# Patient Record
Sex: MALE | Race: WHITE | NOT HISPANIC OR LATINO | Employment: OTHER | URBAN - METROPOLITAN AREA
[De-identification: names, ages, dates, MRNs, and addresses within clinical notes are randomized per-mention and may not be internally consistent; named-entity substitution may affect disease eponyms.]

---

## 2023-11-15 ASSESSMENT — ENCOUNTER SYMPTOMS
DIFFICULTY URINATING: 0
EYES NEGATIVE: 1
ALLERGIC/IMMUNOLOGIC NEGATIVE: 1
FEVER: 0
CHILLS: 0
COUGH: 0
NAUSEA: 0
ENDOCRINE NEGATIVE: 1
SHORTNESS OF BREATH: 0
PSYCHIATRIC NEGATIVE: 1

## 2023-11-15 NOTE — PROGRESS NOTES
Subjective   Patient ID: Corey May is a 78 y.o. male.    HPI  Hx of prostate ca. S/P XRT in 2012..MRI on 12/22 showed focal T2 hypointense lesion with diffusion restriction in the left peripheral zone at the prostate midgland suspicious for underlying viable neoplasm.  Most recent PSA was 8.46 on 11/23. Prior PSA was  7.14 on 4/23. Prior PSA was 6.83 on 11/22. Prior PSA was 5.31 on 5/22. Prior PSA is 5.86 (11/21) Previous PSA was 4.77 on 6/2021. . Prior PSA was 4.3 12/20.. Post XRT Bx has not done been done..LUTs are chronic and mild...some urgency and frequency..weak stream at times...No dysuria...No hematuria...Nocturia x1-2.. Caffeine does worsen LUTs.. No medications for LUTs.. ED is chronic. Patient has Sildenafil       Review of Systems   Constitutional:  Negative for chills and fever.   HENT: Negative.     Eyes: Negative.    Respiratory:  Negative for cough and shortness of breath.    Cardiovascular:  Negative for chest pain and leg swelling.   Gastrointestinal:  Negative for nausea.   Endocrine: Negative.    Genitourinary:  Negative for difficulty urinating.        Negative except for documented in HPI   Allergic/Immunologic: Negative.    Neurological:         Alert & oriented X 3   Hematological:         Denies blood thinners   Psychiatric/Behavioral: Negative.         Objective   Physical Exam  Vitals and nursing note reviewed.   Constitutional:       General: He is not in acute distress.     Appearance: Normal appearance.   Pulmonary:      Effort: Pulmonary effort is normal.   Abdominal:      Tenderness: There is no abdominal tenderness.   Genitourinary:     Comments: Kidneys non palpable bilaterally  Bladder non palpable or tender  Scrotum no mass, No hydrocele  Epididymis- No spermatocele. Non Tender.  Testicles: No mass. Symmetric  Urethra: No discharge  Penis within normal limits... No lesions. No phimosis  Prostate - symmetric, no nodules. Firm  Seminal Vesicals: No mass.  Sphincter tone:  normal  Neurological:      Mental Status: He is alert.         Assessment/Plan   Diagnoses and all orders for this visit:  Malignant neoplasm of prostate (CMS/HCC)  Elevated PSA  Erectile dysfunction, unspecified erectile dysfunction type  Nocturia    All available PSA values reviewed, Options discussed. Questions answered.  MRI reviewed  Pros and cons of prostate biopsy reviewed. Other options discussed. Questions answered  Discussed HIFU or Cryo and Lupron-Patient prefers to Hold off   Diet changes for prostate health discussed and educational information given. Pros/Cons of prostate health supplements discussed.   Treatment options for LUTS reviewed  Discussed timed voiding. Discussed fluid and caffeine intake  Treatment options for ED reviewed.  Sildenafil Rx refilled  Lifestyle change to help prevent UTIs discussed. Encouraged fluid intake.    F/U with PSA with PSA

## 2023-11-16 ENCOUNTER — OFFICE VISIT (OUTPATIENT)
Dept: UROLOGY | Facility: CLINIC | Age: 78
End: 2023-11-16
Payer: MEDICARE

## 2023-11-16 VITALS — RESPIRATION RATE: 16 BRPM | WEIGHT: 127 LBS | BODY MASS INDEX: 22.5 KG/M2

## 2023-11-16 DIAGNOSIS — N52.9 ERECTILE DYSFUNCTION, UNSPECIFIED ERECTILE DYSFUNCTION TYPE: ICD-10-CM

## 2023-11-16 DIAGNOSIS — C61 MALIGNANT NEOPLASM OF PROSTATE (MULTI): ICD-10-CM

## 2023-11-16 DIAGNOSIS — R97.20 ELEVATED PSA: ICD-10-CM

## 2023-11-16 DIAGNOSIS — R35.1 NOCTURIA: ICD-10-CM

## 2023-11-16 PROCEDURE — 1036F TOBACCO NON-USER: CPT | Performed by: UROLOGY

## 2023-11-16 PROCEDURE — 99214 OFFICE O/P EST MOD 30 MIN: CPT | Performed by: UROLOGY

## 2023-11-16 RX ORDER — LISINOPRIL 30 MG/1
TABLET ORAL
COMMUNITY

## 2023-11-16 RX ORDER — SEMAGLUTIDE 1.34 MG/ML
INJECTION, SOLUTION SUBCUTANEOUS
COMMUNITY

## 2023-11-16 RX ORDER — AMLODIPINE BESYLATE 5 MG/1
TABLET ORAL
COMMUNITY

## 2023-11-16 RX ORDER — SILDENAFIL 100 MG/1
TABLET, FILM COATED ORAL
COMMUNITY
Start: 2023-05-04 | End: 2024-05-16 | Stop reason: SDUPTHER

## 2023-11-16 RX ORDER — ATORVASTATIN CALCIUM 20 MG/1
TABLET, FILM COATED ORAL
COMMUNITY

## 2024-05-14 ASSESSMENT — ENCOUNTER SYMPTOMS
SHORTNESS OF BREATH: 0
PSYCHIATRIC NEGATIVE: 1
NAUSEA: 0
CHILLS: 0
ALLERGIC/IMMUNOLOGIC NEGATIVE: 1
ENDOCRINE NEGATIVE: 1
EYES NEGATIVE: 1
FEVER: 0
DIFFICULTY URINATING: 0
COUGH: 0

## 2024-05-14 NOTE — PROGRESS NOTES
Subjective   Patient ID: Corey May is a 79 y.o. male.    HPI  Hx of prostate ca. S/P XRT in 2012..MRI on 12/22 showed focal T2 hypointense lesion with diffusion restriction in the left peripheral zone at the prostate midgland suspicious for underlying viable neoplasm.  Most recent PSA was 10.20 (5/24) Previous PSA was 8.46 on 11/23. Prior PSA was  7.14 on 4/23. Prior PSA was 6.83 on 11/22. Prior PSA was 5.31 on 5/22. Prior PSA is 5.86 (11/21) Previous PSA was 4.77 on 6/2021. . Prior PSA was 4.3 12/20.. Post XRT Bx has not done been done..LUTs are chronic and mild...some urgency and frequency..weak stream at times...No dysuria...No hematuria...Nocturia x1-2.. Caffeine does worsen LUTs.. No medications for LUTs.. ED is chronic. Patient has Sildenafil          Review of Systems   Constitutional:  Negative for chills and fever.   HENT: Negative.     Eyes: Negative.    Respiratory:  Negative for cough and shortness of breath.    Cardiovascular:  Negative for chest pain and leg swelling.   Gastrointestinal:  Negative for nausea.   Endocrine: Negative.    Genitourinary:  Negative for difficulty urinating.        Negative except for documented in HPI   Allergic/Immunologic: Negative.    Neurological:         Alert & oriented X 3   Hematological:         Denies blood thinners   Psychiatric/Behavioral: Negative.         Objective   Physical Exam  Vitals and nursing note reviewed.   Constitutional:       General: He is not in acute distress.     Appearance: Normal appearance.   Pulmonary:      Effort: Pulmonary effort is normal.   Abdominal:      Tenderness: There is no abdominal tenderness.   Genitourinary:     Comments: Kidneys non palpable bilaterally  Bladder non palpable or tender  Scrotum no mass, No hydrocele  Epididymis- No spermatocele. Non Tender.  Testicles: No mass. Symmetric  Urethra: No discharge  Penis within normal limits... No lesions. uncircumcised  Prostate - Asymmetric, no nodules but L>R  Seminal  Vesicals: No mass.  Sphincter tone: normal  Neurological:      Mental Status: He is alert.         Assessment/Plan   Diagnoses and all orders for this visit:  Nocturia  Erectile dysfunction, unspecified erectile dysfunction type  -     sildenafil (Viagra) 100 mg tablet; take as directed  Elevated PSA  Malignant neoplasm of prostate (Multi)      All available PSA values reviewed, Options discussed. Questions answered.  Past MRI reviewed  New MRI ordered-Patient still sexually active and reluctant to consider further Tx including Lupron   Diet changes for prostate health discussed and educational information given. Pros/Cons of prostate health supplements discussed.   Treatment options for LUTS reviewed  Discussed timed voiding. Discussed fluid and caffeine intake  Treatment options for ED reviewed.  Sildenafil Rx refilled  Lifestyle change to help prevent UTIs discussed. Encouraged fluid intake.    F/U  After MRI

## 2024-05-16 ENCOUNTER — OFFICE VISIT (OUTPATIENT)
Dept: UROLOGY | Facility: CLINIC | Age: 79
End: 2024-05-16
Payer: MEDICARE

## 2024-05-16 VITALS — WEIGHT: 126 LBS | RESPIRATION RATE: 16 BRPM | BODY MASS INDEX: 22.32 KG/M2

## 2024-05-16 DIAGNOSIS — C61 MALIGNANT NEOPLASM OF PROSTATE (MULTI): ICD-10-CM

## 2024-05-16 DIAGNOSIS — N52.9 ERECTILE DYSFUNCTION, UNSPECIFIED ERECTILE DYSFUNCTION TYPE: ICD-10-CM

## 2024-05-16 DIAGNOSIS — R35.1 NOCTURIA: ICD-10-CM

## 2024-05-16 DIAGNOSIS — R97.20 ELEVATED PSA: ICD-10-CM

## 2024-05-16 PROCEDURE — 99214 OFFICE O/P EST MOD 30 MIN: CPT | Performed by: UROLOGY

## 2024-05-16 RX ORDER — SILDENAFIL 100 MG/1
TABLET, FILM COATED ORAL
Qty: 10 TABLET | Refills: 3 | Status: SHIPPED | OUTPATIENT
Start: 2024-05-16

## 2024-05-28 ENCOUNTER — HOSPITAL ENCOUNTER (OUTPATIENT)
Dept: RADIOLOGY | Facility: HOSPITAL | Age: 79
Discharge: HOME | End: 2024-05-28
Payer: MEDICARE

## 2024-05-28 DIAGNOSIS — R97.20 ELEVATED PSA: ICD-10-CM

## 2024-05-28 PROCEDURE — 72197 MRI PELVIS W/O & W/DYE: CPT

## 2024-05-28 PROCEDURE — A9575 INJ GADOTERATE MEGLUMI 0.1ML: HCPCS | Performed by: UROLOGY

## 2024-05-28 PROCEDURE — 72197 MRI PELVIS W/O & W/DYE: CPT | Performed by: STUDENT IN AN ORGANIZED HEALTH CARE EDUCATION/TRAINING PROGRAM

## 2024-05-28 PROCEDURE — 2550000001 HC RX 255 CONTRASTS: Performed by: UROLOGY

## 2024-05-28 PROCEDURE — 76498 UNLISTED MR PROCEDURE: CPT | Performed by: STUDENT IN AN ORGANIZED HEALTH CARE EDUCATION/TRAINING PROGRAM

## 2024-05-28 RX ORDER — GADOTERATE MEGLUMINE 376.9 MG/ML
12 INJECTION INTRAVENOUS
Status: COMPLETED | OUTPATIENT
Start: 2024-05-28 | End: 2024-05-28

## 2024-05-28 RX ADMIN — GADOTERATE MEGLUMINE 12 ML: 376.9 INJECTION INTRAVENOUS at 09:56

## 2024-05-30 ASSESSMENT — ENCOUNTER SYMPTOMS
ENDOCRINE NEGATIVE: 1
CHILLS: 0
PSYCHIATRIC NEGATIVE: 1
FEVER: 0
NAUSEA: 0
ALLERGIC/IMMUNOLOGIC NEGATIVE: 1
SHORTNESS OF BREATH: 0
DIFFICULTY URINATING: 0
COUGH: 0
EYES NEGATIVE: 1

## 2024-05-30 NOTE — PROGRESS NOTES
Subjective   Patient ID: Corey May is a 79 y.o. male.    Virtual or Telephone Consent    An interactive audio and video telecommunication system which permits real time communications between the patient (at the originating site) and provider (at the distant site) was utilized to provide this telehealth service.   Verbal consent was requested and obtained from Corey May on this date, 06/06/24 for a telehealth visit.     HPI  Patient is here for MRI results. Hx of prostate ca. S/P XRT in 2012.. Recent MRI showed T2 hypotense lesion . . MRI on 12/22 showed focal T2 hypointense lesion with diffusion restriction in the left peripheral zone at the prostate midgland suspicious for underlying viable neoplasm.  Most recent PSA was 10.20 (5/24) Previous PSA was 8.46 on 11/23. Prior PSA was  7.14 on 4/23. Prior PSA was 6.83 on 11/22. Prior PSA was 5.31 on 5/22. Prior PSA is 5.86 (11/21) Previous PSA was 4.77 on 6/2021. . Prior PSA was 4.3 12/20.. Post XRT Bx has not done been done..LUTs are chronic and mild...some urgency and frequency..weak stream at times...No dysuria...No hematuria...Nocturia x1-2.. Caffeine does worsen LUTs.. No medications for LUTs.. ED is chronic. Patient has Sildenafil       Review of Systems   Constitutional:  Negative for chills and fever.   HENT: Negative.     Eyes: Negative.    Respiratory:  Negative for cough and shortness of breath.    Cardiovascular:  Negative for chest pain and leg swelling.   Gastrointestinal:  Negative for nausea.   Endocrine: Negative.    Genitourinary:  Negative for difficulty urinating.        Negative except for documented in HPI   Allergic/Immunologic: Negative.    Neurological:         Alert & oriented X 3   Hematological:         Denies blood thinners   Psychiatric/Behavioral: Negative.         Objective   Physical Exam  No PE done given the virtual nature of visit.   Assessment/Plan   Diagnoses and all orders for this visit:  Nocturia  Erectile dysfunction,  unspecified erectile dysfunction type  Elevated PSA  Malignant neoplasm of prostate (Multi)    All available PSA values reviewed, Options discussed. Questions answered.  MRI x 2 reviewed  Pros and cons of prostate biopsy reviewed. Other options discussed. Questions answered  Will recheck PSA in 6 months and if PSA rises will proceed With MRI Fusion Bx-Patient understands options and is in agreement with this plan   Diet changes for prostate health discussed and educational information given. Pros/Cons of prostate health supplements discussed.   Treatment options for LUTS reviewed  Discussed timed voiding. Discussed fluid and caffeine intake  Treatment options for ED reviewed.  Continue Sildenafil PRN  Lifestyle change to help prevent UTIs discussed. Encouraged fluid intake.  UA ordered for F/U No recent UA on record  F/U  6 months with PSA and UA

## 2024-06-06 ENCOUNTER — TELEMEDICINE (OUTPATIENT)
Dept: UROLOGY | Facility: CLINIC | Age: 79
End: 2024-06-06
Payer: MEDICARE

## 2024-06-06 DIAGNOSIS — R97.20 ELEVATED PSA: ICD-10-CM

## 2024-06-06 DIAGNOSIS — C61 MALIGNANT NEOPLASM OF PROSTATE (MULTI): ICD-10-CM

## 2024-06-06 DIAGNOSIS — R35.1 NOCTURIA: ICD-10-CM

## 2024-06-06 DIAGNOSIS — N52.9 ERECTILE DYSFUNCTION, UNSPECIFIED ERECTILE DYSFUNCTION TYPE: ICD-10-CM

## 2024-06-06 PROCEDURE — 1036F TOBACCO NON-USER: CPT | Performed by: UROLOGY

## 2024-06-06 PROCEDURE — 1159F MED LIST DOCD IN RCRD: CPT | Performed by: UROLOGY

## 2024-06-06 PROCEDURE — 99213 OFFICE O/P EST LOW 20 MIN: CPT | Performed by: UROLOGY

## 2024-12-05 ASSESSMENT — ENCOUNTER SYMPTOMS
EYES NEGATIVE: 1
SHORTNESS OF BREATH: 0
NAUSEA: 0
PSYCHIATRIC NEGATIVE: 1
ALLERGIC/IMMUNOLOGIC NEGATIVE: 1
ENDOCRINE NEGATIVE: 1
DIFFICULTY URINATING: 0
FEVER: 0
CHILLS: 0
COUGH: 0

## 2024-12-05 NOTE — PROGRESS NOTES
Subjective   Patient ID: Corey May is a 79 y.o. male.    HPI  Hx of prostate ca. S/P XRT in 2012.. Recent MRI on 6/24 showed probable recurrence of Prostate cancer.  . . MRI on 12/22 showed focal T2 hypointense lesion with diffusion restriction in the left peripheral zone at the prostate midgland suspicious for underlying viable neoplasm. Patient has declined repeat Bx so far.  Most recent PSA was 10.30 on 12/24. Prior PSA was  10.20 (5/24) Previous PSA was 8.46 on 11/23. Prior PSA was  7.14 on 4/23. Prior PSA was 6.83 on 11/22. Prior PSA was 5.31 on 5/22. Prior PSA is 5.86 (11/21) Previous PSA was 4.77 on 6/2021. . Prior PSA was 4.3 12/20.. Post XRT Bx has not done been done..LUTs are chronic and mild...some urgency and frequency..weak stream at times...No dysuria...No hematuria...Nocturia x1-2.. Caffeine does worsen LUTs.. No medications for LUTs.. ED is chronic. Patient has Sildenafil     [facility].     Review of Systems   Constitutional:  Negative for chills and fever.   HENT: Negative.     Eyes: Negative.    Respiratory:  Negative for cough and shortness of breath.    Cardiovascular:  Negative for chest pain and leg swelling.   Gastrointestinal:  Negative for nausea.   Endocrine: Negative.    Genitourinary:  Negative for difficulty urinating.        Negative except for documented in HPI   Allergic/Immunologic: Negative.    Neurological:         Alert & oriented X 3   Hematological:         Denies blood thinners   Psychiatric/Behavioral: Negative.         Objective   Physical Exam  Vitals and nursing note reviewed.   Constitutional:       General: He is not in acute distress.     Appearance: Normal appearance.   Pulmonary:      Effort: Pulmonary effort is normal.   Abdominal:      Tenderness: There is no abdominal tenderness.   Genitourinary:     Comments: Kidneys non palpable bilaterally  Bladder non palpable or tender  Scrotum no mass, No hydrocele  Epididymis- No spermatocele. Non  Tender.  Testicles: No mass. Symmetric  Urethra: No discharge  Penis within normal limits... No lesions. No phimosis  Prostate - Asymmetric, no nodules but Left side more prominent  Seminal Vesicals: No mass.  Sphincter tone: normal  Neurological:      Mental Status: He is alert.         Assessment/Plan       Diagnoses and all orders for this visit:  Malignant neoplasm of prostate (Multi)  Elevated PSA  Erectile dysfunction, unspecified erectile dysfunction type  Nocturia    All available PSA values reviewed, Options discussed. Questions answered.  Past MRI reviewed  Pros and cons of prostate biopsy reviewed. Other options discussed. Questions answered  Patient has declined Repeat Bx-I explained to patient that he would have sedation and not feel pain. He will consider doing this in the spring. Patient is aware of likely recurrence.    Diet changes for prostate health discussed and educational information given. Pros/Cons of prostate health supplements discussed.   Treatment options for LUTS reviewed  Discussed timed voiding. Discussed fluid and caffeine intake  Treatment options for ED reviewed.  Sildenafil Rx given  Lifestyle change to help prevent UTIs discussed. Encouraged fluid intake.  UA reviewed-Clear    F/U 4/25 with PSA and to discuss MRI guided Bx

## 2024-12-09 ENCOUNTER — APPOINTMENT (OUTPATIENT)
Dept: UROLOGY | Facility: CLINIC | Age: 79
End: 2024-12-09
Payer: MEDICARE

## 2024-12-09 VITALS — BODY MASS INDEX: 21.79 KG/M2 | WEIGHT: 123 LBS

## 2024-12-09 DIAGNOSIS — N52.9 ERECTILE DYSFUNCTION, UNSPECIFIED ERECTILE DYSFUNCTION TYPE: ICD-10-CM

## 2024-12-09 DIAGNOSIS — C61 MALIGNANT NEOPLASM OF PROSTATE (MULTI): ICD-10-CM

## 2024-12-09 DIAGNOSIS — R35.1 NOCTURIA: ICD-10-CM

## 2024-12-09 DIAGNOSIS — R97.20 ELEVATED PSA: ICD-10-CM

## 2024-12-09 PROCEDURE — 99214 OFFICE O/P EST MOD 30 MIN: CPT | Performed by: UROLOGY

## 2024-12-09 PROCEDURE — 1036F TOBACCO NON-USER: CPT | Performed by: UROLOGY

## 2024-12-09 PROCEDURE — 1159F MED LIST DOCD IN RCRD: CPT | Performed by: UROLOGY

## 2024-12-09 RX ORDER — SILDENAFIL 100 MG/1
TABLET, FILM COATED ORAL
Qty: 30 TABLET | Refills: 6 | Status: SHIPPED | OUTPATIENT
Start: 2024-12-09

## 2025-04-10 LAB — PSA SERPL-MCNC: 11.29 NG/ML

## 2025-04-14 ENCOUNTER — APPOINTMENT (OUTPATIENT)
Dept: UROLOGY | Facility: CLINIC | Age: 80
End: 2025-04-14
Payer: MEDICARE

## 2025-04-14 ASSESSMENT — ENCOUNTER SYMPTOMS
PSYCHIATRIC NEGATIVE: 1
ENDOCRINE NEGATIVE: 1
CHILLS: 0
FEVER: 0
NAUSEA: 0
SHORTNESS OF BREATH: 0
EYES NEGATIVE: 1
DIFFICULTY URINATING: 0
ALLERGIC/IMMUNOLOGIC NEGATIVE: 1
COUGH: 0

## 2025-04-14 NOTE — PROGRESS NOTES
Subjective   Patient ID: Corey May is a 80 y.o. male.  Virtual or Telephone Consent    An interactive audio and video telecommunication system which permits real time communications between the patient (at the originating site) and provider (at the distant site) was utilized to provide this telehealth service.   Verbal consent was requested and obtained from Corey May on this date, 04/16/25 for a telehealth visit and the patient's location was confirmed at the time of the visit.  HPI  Hx of prostate ca. S/P XRT in 2012.. Prostate MRI on 6/24 showed probable recurrence of Prostate cancer(This has not been biopsied at patient's request)... MRI on 12/22 showed focal T2 hypointense lesion with diffusion restriction in the left peripheral zone at the prostate midgland suspicious for underlying viable neoplasm. Patient has declined repeat Bx so far.  Most recent PSA was 11.29 on 4/25. Prior PSA was 10.30 on 12/24. Prior PSA was  10.20 (5/24) Previous PSA was 8.46 on 11/23. Prior PSA was  7.14 on 4/23. Prior PSA was 6.83 on 11/22. Prior PSA was 5.31 on 5/22. Prior PSA is 5.86 (11/21) Previous PSA was 4.77 on 6/2021. . Prior PSA was 4.3 12/20.. Post XRT Bx has not done been done..LUTs are chronic and mild...some urgency and frequency..weak stream at times...No dysuria...No hematuria...Nocturia x1-2.. Caffeine does worsen LUTs.. No medications for LUTs.. ED is chronic. Patient has Sildenafil      [facility] was [facility].       Review of Systems   Constitutional:  Negative for chills and fever.   HENT: Negative.     Eyes: Negative.    Respiratory:  Negative for cough and shortness of breath.    Cardiovascular:  Negative for chest pain and leg swelling.   Gastrointestinal:  Negative for nausea.   Endocrine: Negative.    Genitourinary:  Negative for difficulty urinating.        Negative except for documented in HPI   Allergic/Immunologic: Negative.    Neurological:         Alert & oriented X 3   Hematological:          Denies blood thinners   Psychiatric/Behavioral: Negative.         Objective   Physical Exam  No PE done given the virtual nature of visit.   Assessment/Plan   Diagnoses and all orders for this visit:  Nocturia  Erectile dysfunction, unspecified erectile dysfunction type  Elevated PSA  Malignant neoplasm of prostate (Multi)      All available PSA values reviewed, Options discussed. Questions answered.  Past MRI reviewed-New MRI ordered  Pros and cons of prostate biopsy reviewed. Other options discussed. Questions answered  Discussed referral to radiation oncology to discuss targeted treatment  Pros/cons of Lupron Discussed-patient still sexually active and reluctant   Diet changes for prostate health discussed and educational information given. Pros/Cons of prostate health supplements discussed.   Treatment options for LUTS reviewed-Not bothersome  Discussed timed voiding. Discussed fluid and caffeine intake  Treatment options for ED reviewed-has Sildenafil but does not need.   Lifestyle change to help prevent UTIs discussed. Encouraged fluid intake.  UA 10/24 reviewed and negative    F/U 6-8weeks with Prostate MRI

## 2025-04-16 ENCOUNTER — APPOINTMENT (OUTPATIENT)
Dept: UROLOGY | Facility: CLINIC | Age: 80
End: 2025-04-16
Payer: MEDICARE

## 2025-04-16 DIAGNOSIS — C61 MALIGNANT NEOPLASM OF PROSTATE (MULTI): ICD-10-CM

## 2025-04-16 DIAGNOSIS — N52.9 ERECTILE DYSFUNCTION, UNSPECIFIED ERECTILE DYSFUNCTION TYPE: ICD-10-CM

## 2025-04-16 DIAGNOSIS — R35.1 NOCTURIA: ICD-10-CM

## 2025-04-16 DIAGNOSIS — R97.20 ELEVATED PSA: ICD-10-CM

## 2025-04-16 PROCEDURE — 1159F MED LIST DOCD IN RCRD: CPT | Performed by: UROLOGY

## 2025-04-16 PROCEDURE — 99214 OFFICE O/P EST MOD 30 MIN: CPT | Performed by: UROLOGY

## 2025-04-28 ENCOUNTER — HOSPITAL ENCOUNTER (OUTPATIENT)
Dept: RADIOLOGY | Facility: HOSPITAL | Age: 80
Discharge: HOME | End: 2025-04-28
Payer: MEDICARE

## 2025-04-28 DIAGNOSIS — R97.20 ELEVATED PSA: ICD-10-CM

## 2025-04-28 DIAGNOSIS — C61 MALIGNANT NEOPLASM OF PROSTATE (MULTI): ICD-10-CM

## 2025-04-28 PROCEDURE — 72197 MRI PELVIS W/O & W/DYE: CPT | Performed by: RADIOLOGY

## 2025-04-28 PROCEDURE — 2550000001 HC RX 255 CONTRASTS: Performed by: UROLOGY

## 2025-04-28 PROCEDURE — A9575 INJ GADOTERATE MEGLUMI 0.1ML: HCPCS | Performed by: UROLOGY

## 2025-04-28 PROCEDURE — 72197 MRI PELVIS W/O & W/DYE: CPT

## 2025-04-28 RX ORDER — GADOTERATE MEGLUMINE 376.9 MG/ML
11 INJECTION INTRAVENOUS
Status: COMPLETED | OUTPATIENT
Start: 2025-04-28 | End: 2025-04-28

## 2025-04-28 RX ADMIN — GADOTERATE MEGLUMINE 11 ML: 376.9 INJECTION INTRAVENOUS at 17:11
